# Patient Record
Sex: MALE | Race: BLACK OR AFRICAN AMERICAN | NOT HISPANIC OR LATINO | URBAN - METROPOLITAN AREA
[De-identification: names, ages, dates, MRNs, and addresses within clinical notes are randomized per-mention and may not be internally consistent; named-entity substitution may affect disease eponyms.]

---

## 2017-05-30 ENCOUNTER — INPATIENT (INPATIENT)
Facility: HOSPITAL | Age: 27
LOS: 6 days | Discharge: HOME | DRG: 885 | End: 2017-06-06
Attending: STUDENT IN AN ORGANIZED HEALTH CARE EDUCATION/TRAINING PROGRAM | Admitting: STUDENT IN AN ORGANIZED HEALTH CARE EDUCATION/TRAINING PROGRAM
Payer: COMMERCIAL

## 2017-05-30 VITALS
OXYGEN SATURATION: 100 % | TEMPERATURE: 98 F | WEIGHT: 223.11 LBS | DIASTOLIC BLOOD PRESSURE: 88 MMHG | SYSTOLIC BLOOD PRESSURE: 145 MMHG | RESPIRATION RATE: 17 BRPM | HEART RATE: 93 BPM

## 2017-05-30 DIAGNOSIS — F10.20 ALCOHOL DEPENDENCE, UNCOMPLICATED: ICD-10-CM

## 2017-05-30 DIAGNOSIS — F20.0 PARANOID SCHIZOPHRENIA: ICD-10-CM

## 2017-05-30 LAB
AMPHET UR-MCNC: NEGATIVE — SIGNIFICANT CHANGE UP
ANION GAP SERPL CALC-SCNC: 12 MMOL/L — SIGNIFICANT CHANGE UP (ref 5–17)
APAP SERPL-MCNC: <15 UG/ML — SIGNIFICANT CHANGE UP (ref 10–30)
APPEARANCE UR: CLEAR — SIGNIFICANT CHANGE UP
BARBITURATES UR SCN-MCNC: NEGATIVE — SIGNIFICANT CHANGE UP
BASOPHILS NFR BLD AUTO: 0.2 % — SIGNIFICANT CHANGE UP (ref 0–2)
BENZODIAZ UR-MCNC: NEGATIVE — SIGNIFICANT CHANGE UP
BILIRUB UR-MCNC: NEGATIVE — SIGNIFICANT CHANGE UP
BUN SERPL-MCNC: 10 MG/DL — SIGNIFICANT CHANGE UP (ref 7–23)
CALCIUM SERPL-MCNC: 10.2 MG/DL — SIGNIFICANT CHANGE UP (ref 8.4–10.5)
CHLORIDE SERPL-SCNC: 97 MMOL/L — SIGNIFICANT CHANGE UP (ref 96–108)
CO2 SERPL-SCNC: 28 MMOL/L — SIGNIFICANT CHANGE UP (ref 22–31)
COCAINE METAB.OTHER UR-MCNC: NEGATIVE — SIGNIFICANT CHANGE UP
COLOR SPEC: YELLOW — SIGNIFICANT CHANGE UP
CREAT SERPL-MCNC: 1.1 MG/DL — SIGNIFICANT CHANGE UP (ref 0.5–1.3)
DIFF PNL FLD: NEGATIVE — SIGNIFICANT CHANGE UP
EOSINOPHIL NFR BLD AUTO: 0.9 % — SIGNIFICANT CHANGE UP (ref 0–6)
GLUCOSE SERPL-MCNC: 79 MG/DL — SIGNIFICANT CHANGE UP (ref 70–99)
GLUCOSE UR QL: NEGATIVE — SIGNIFICANT CHANGE UP
HCT VFR BLD CALC: 45.5 % — SIGNIFICANT CHANGE UP (ref 39–50)
HGB BLD-MCNC: 16.2 G/DL — SIGNIFICANT CHANGE UP (ref 13–17)
KETONES UR-MCNC: NEGATIVE — SIGNIFICANT CHANGE UP
LEUKOCYTE ESTERASE UR-ACNC: NEGATIVE — SIGNIFICANT CHANGE UP
LYMPHOCYTES # BLD AUTO: 22.1 % — SIGNIFICANT CHANGE UP (ref 13–44)
MCHC RBC-ENTMCNC: 32.9 PG — SIGNIFICANT CHANGE UP (ref 27–34)
MCHC RBC-ENTMCNC: 35.6 G/DL — SIGNIFICANT CHANGE UP (ref 32–36)
MCV RBC AUTO: 92.3 FL — SIGNIFICANT CHANGE UP (ref 80–100)
METHADONE UR-MCNC: NEGATIVE — SIGNIFICANT CHANGE UP
MONOCYTES NFR BLD AUTO: 13.7 % — SIGNIFICANT CHANGE UP (ref 2–14)
NEUTROPHILS NFR BLD AUTO: 63.1 % — SIGNIFICANT CHANGE UP (ref 43–77)
NITRITE UR-MCNC: NEGATIVE — SIGNIFICANT CHANGE UP
OPIATES UR-MCNC: NEGATIVE — SIGNIFICANT CHANGE UP
PCP SPEC-MCNC: SIGNIFICANT CHANGE UP
PCP UR-MCNC: NEGATIVE — SIGNIFICANT CHANGE UP
PH UR: 6 — SIGNIFICANT CHANGE UP (ref 5–8)
PLATELET # BLD AUTO: 167 K/UL — SIGNIFICANT CHANGE UP (ref 150–400)
POTASSIUM SERPL-MCNC: 3.9 MMOL/L — SIGNIFICANT CHANGE UP (ref 3.5–5.3)
POTASSIUM SERPL-SCNC: 3.9 MMOL/L — SIGNIFICANT CHANGE UP (ref 3.5–5.3)
PROT UR-MCNC: NEGATIVE MG/DL — SIGNIFICANT CHANGE UP
RBC # BLD: 4.93 M/UL — SIGNIFICANT CHANGE UP (ref 4.2–5.8)
RBC # FLD: 12.4 % — SIGNIFICANT CHANGE UP (ref 10.3–16.9)
SALICYLATES SERPL-MCNC: <0.3 MG/DL — LOW (ref 2.8–20)
SODIUM SERPL-SCNC: 137 MMOL/L — SIGNIFICANT CHANGE UP (ref 135–145)
SP GR SPEC: <=1.005 — SIGNIFICANT CHANGE UP (ref 1–1.03)
THC UR QL: NEGATIVE — SIGNIFICANT CHANGE UP
UROBILINOGEN FLD QL: 1 E.U./DL — SIGNIFICANT CHANGE UP
WBC # BLD: 4.4 K/UL — SIGNIFICANT CHANGE UP (ref 3.8–10.5)
WBC # FLD AUTO: 4.4 K/UL — SIGNIFICANT CHANGE UP (ref 3.8–10.5)

## 2017-05-30 PROCEDURE — 93010 ELECTROCARDIOGRAM REPORT: CPT

## 2017-05-30 PROCEDURE — 99285 EMERGENCY DEPT VISIT HI MDM: CPT

## 2017-05-30 PROCEDURE — 99285 EMERGENCY DEPT VISIT HI MDM: CPT | Mod: 25

## 2017-05-30 RX ORDER — RISPERIDONE 4 MG/1
0.5 TABLET ORAL AT BEDTIME
Qty: 0 | Refills: 0 | Status: DISCONTINUED | OUTPATIENT
Start: 2017-05-30 | End: 2017-06-06

## 2017-05-30 RX ORDER — RISPERIDONE 4 MG/1
2 TABLET ORAL
Qty: 0 | Refills: 0 | Status: DISCONTINUED | OUTPATIENT
Start: 2017-05-30 | End: 2017-05-30

## 2017-05-30 RX ADMIN — Medication 1 MILLIGRAM(S): at 17:45

## 2017-05-30 RX ADMIN — Medication 1 MILLIGRAM(S): at 22:12

## 2017-05-30 RX ADMIN — RISPERIDONE 0.5 MILLIGRAM(S): 4 TABLET ORAL at 22:13

## 2017-05-30 NOTE — ED BEHAVIORAL HEALTH ASSESSMENT NOTE - DESCRIPTION
on 1:1 in hallway accompanied by mother.  In behavioral control. seasonal allergies; s/o knee infection per report Lives at home with his mom, her b.f., and his younger sister.  She works at same company of mom who is a  - for social support service of mental illness.  Pt is 4th of 6 children from his mom who has been  twice, first to his father  from him "years ago", and the other.  4 of his sibs have children and 3 are . on 1:1 in hallway accompanied by mother.  In behavioral control.  Referred to [facility] ETP program which was contacted this weekend, and had pt meet with coordinator, Belinda Adkins.  Initially accepted, became hesitant and reluctant, but ultimately opted to pursue psychiatric admission.

## 2017-05-30 NOTE — ED BEHAVIORAL HEALTH ASSESSMENT NOTE - RISK ASSESSMENT
pt is at acutely increased risk based on new-onset sxs causing significant distress and difficulty with management in the outpatient setting, but he is not suicidal or manifesting violent or aggressive behavior, though does become slightly agitated/ restless.

## 2017-05-30 NOTE — ED ADULT NURSE NOTE - NS ED NURSE LEVEL OF CONSCIOUSNESS ORIENTATION
Oriented - self; Oriented - place; Oriented - time Hallucination - audio/Oriented - self; Oriented - place; Oriented - time

## 2017-05-30 NOTE — ED BEHAVIORAL HEALTH ASSESSMENT NOTE - OTHER PAST PSYCHIATRIC HISTORY (INCLUDE DETAILS REGARDING ONSET, COURSE OF ILLNESS, INPATIENT/OUTPATIENT TREATMENT)
dx with severe speech and language deficits starting in [employer], as well as other cognitive impairment with possible short-term memory impairment per recall from mother since 3rd-5th grade, engaged in special education, only completed 10th grade, and failed to complete GED or 'pre-GED' courses.  Was in treatment with SW x 1-2 years, but then dx with possible 'mild' autistic d/o ("possible 'Asperger's' d/o, per mom), referred to psychiatrist without success due to logistical care coordination issue, it seems.  No other past psychotropic agents prescribed, no psychiatric hospitalizations.

## 2017-05-30 NOTE — ED ADULT NURSE NOTE - CHIEF COMPLAINT QUOTE
"I started hearing voices telling me curses that started Saturday."  Brought in by mother for paranoia, Hallucinations.  Also c/o chest pain, headache, abdominal pain.  Denies SI.

## 2017-05-30 NOTE — ED BEHAVIORAL HEALTH ASSESSMENT NOTE - DETAILS
father- depression; paternal uncle- possible SMPI; niece- possibly also on risperidone: all per mom. dad- crack/ cocaine use disorder, status unknown; maternal grandmother and aunt- both crack and alcohol use disorder in remission h/a presented to nursing mother present and accompanying pt

## 2017-05-30 NOTE — ED BEHAVIORAL HEALTH ASSESSMENT NOTE - DESCRIPTION (FIRST USE, LAST USE, QUANTITY, FREQUENCY, DURATION)
minimized, pt report of 1-2x weekly, liquor- mainly hoang; per mom- 3-4 day binges 2-4x weekly, with a day to a few interceding, if any, only since progression of sxs, over the last few mos.

## 2017-05-30 NOTE — ED BEHAVIORAL HEALTH ASSESSMENT NOTE - MEDICAL ISSUES AND PLAN (INCLUDE STANDING AND PRN MEDICATION)
Claritin prn, Tylenol prn Claritin prn, Tylenol prn; f/u TSH, lipids, metabolic parameters (ordered) on atypical antipsychotic; alcohol level and CMP recommended; no need for HCT which was conducted at OSH with result negative and available

## 2017-05-30 NOTE — ED PROVIDER NOTE - PHYSICAL EXAMINATION
CONSTITUTIONAL: Well-appearing; well-nourished; in no apparent distress.   HEAD: Normocephalic; atraumatic.   EYES: PERRL; EOM intact; conjunctiva and sclera clear  ENT: normal nose; no rhinorrhea; MMM  NECK: Supple; non-tender;   CARDIOVASCULAR: Normal S1, S2; no murmurs, rubs, or gallops. Regular rate and rhythm.   RESPIRATORY: Breathing easily; breath sounds clear and equal bilaterally; no wheezes, rhonchi, or rales.  GI: Soft; non-distended; non-tender;   EXT: No cyanosis or edema; N/V intact  SKIN: Normal for age and race; warm; dry; good turgor; no apparent lesions or rash.   NEURO: A & O x 3; face symmetric; grossly unremarkable.   PSYCHOLOGICAL: The patient’s mood and manner are appropriate. No SI/HI

## 2017-05-30 NOTE — ED BEHAVIORAL HEALTH ASSESSMENT NOTE - PSYCHIATRIC ISSUES AND PLAN (INCLUDE STANDING AND PRN MEDICATION)
c/w risperidone 2 mg BID; risperidone 0.5 mg q4h prn agitation, and at bedtime prn insomnia c/w risperidone 2 mg BID; risperidone 0.5 mg q4h prn agitation, and at bedtime prn insomnia; engage as possible as pt became somewhat reluctant to pursue admission; consider dispo to [facility] ETP on discharge

## 2017-05-30 NOTE — ED BEHAVIORAL HEALTH ASSESSMENT NOTE - SUMMARY
25yo SBM with h/o possible mild autistic d/o p/w progressive sxs of psychosis with auditory hallucinations over the last 6 mos, becoming distressing over the last several days for pt who presented and was released from OSH with substantial dose of risperidone just started since then, yesterday, but sxs have become difficult to manage in the outpatient setting.

## 2017-05-30 NOTE — ED BEHAVIORAL HEALTH ASSESSMENT NOTE - HPI (INCLUDE ILLNESS QUALITY, SEVERITY, DURATION, TIMING, CONTEXT, MODIFYING FACTORS, ASSOCIATED SIGNS AND SYMPTOMS)
Pt reports that he's been experiencing auditory hallucinations for the last few days of a few voices, recognized as including his brother-in-law and a woman, both confirmed to be people with whom he has troubled rapport, calling him names/ cursing/ taunting him.  He also c/o associated insomnia.  He does endorse 'distress' and "anger", denies depressed mood, suicidal ideation, homicidal ideation, self-harm or aggressive thoughts.    He denies any other/ attenuated such sxs.  However, his mom does report having persisted x 6 mos over which course he's been observed attending internal stimuli/ 'mumbling to himself', e.g. per present report noticed to be having conversations on the phone without anyone on the other end of the line, or recurrently arguing with others not present, confirmed increased irritability, and this described as not at all typical for the patient.    He also describes experiencing "pain", specifically h/a and mom also reports c/o stomach ache.  He contacted emergency services because he was 'concerned about his health', having evidently become increasingly agitated and pacing, without redirection or calming, and was apparently BIBA to [facility] ED where he was released with prescription for Risperdal 2 mg BID which he's taken since then, starting yesterday.  Since then, however, his sxs markedly progressed with his mom reporting that he began stating that "lasers are shooting" at him and he put blankets over the windows to block this, and has also manifest other generally bizarre behavior per report to ED physician.  [facility] was listed as a resource so pt and mom presented to ED here.  Pt is guarded about persecutory delusions, claiming that he just once saw a laser light and thought that a gun was being pointed at him, but his mom describes this happening recurrently based evidently on visual illusion from the TV screen or computer monitor about which she repeatedly redirected him, and that he also c/o mind/ body control. He does not indicate any clear stressors, and his mom corroborates that he's just seemed "stressed" and has not been sleeping but is not able to clarify any concrete changes or triggers, even though she does describe that he "doesn't like change" but only noting on grasping for recent life events that [place] the eldest 5 [place] ago and the youngest 1 year ago, both for work/ family.  She does not indicate any overt alarm regarding self-harm or violent behavior/ risk but describes that he's been persistently paranoid and disorganized/ bizarre in behavior, requiring support that is interfering with independent functioning even at home such that she has had to take off from work to respond to his distress.  He specifies feeling angry about his sxs, and seeks reassurance.    Typical daytime activities consist of playing video games, basketball, or going for walks.    He does endorse drinking alcohol regularly, specifically hoang, but minimizes frequency/ amount/ impairment, with mom reporting that he will drink on 3-4 day binges but seemingly with only a day or a few interceding, if any, as she notes this being 2-4x weekly, and consuming beyond a level of intoxication to becoming impaired in speech and motor functioning, only since progression of sxs in the past few mos.  It seems that he drinks partly for mitigation of sxs which diminish as a result. Denies any other recreational or illicit substance use.

## 2017-05-30 NOTE — ED PROVIDER NOTE - OBJECTIVE STATEMENT
27yo man with c/o hearing voices. Pt states the voices tell him all sorts of things, however do not tell him to hurt himself nor others. Pt states this has been going on for months. Mother indicates over the past 6 months they intermittently heard him talk to himself however over the past weekend things worsened and pt began consistently pacing, stated laser beams were being shot through the windows and that someone was taking over his body. Pt called the police on sat to be taken to the hospital and was seen at [place] where he had a HCT was seen in the psych ED and discharged with Risperidone which he has been taking. Mom indicates he seems to be getting worse and the medication is not working.

## 2017-05-30 NOTE — ED ADULT NURSE NOTE - CHPI ED SYMPTOMS NEG
no weight loss/no suicidal/no confusion/no change in level of consciousness/no weakness/no disorientation/no homicidal/no agitation

## 2017-05-30 NOTE — ED ADULT TRIAGE NOTE - CHIEF COMPLAINT QUOTE
"I started hearing voices telling me curses that started Saturday."  Brought in by mother for paranoia, HI.  Also c/o chest pain, headache, abdominal pain.  Denies SI. "I started hearing voices telling me curses that started Saturday."  Brought in by mother for paranoia, Hallucinations.  Also c/o chest pain, headache, abdominal pain.  Denies SI.

## 2017-05-31 PROCEDURE — 99223 1ST HOSP IP/OBS HIGH 75: CPT

## 2017-05-31 RX ORDER — DIPHENHYDRAMINE HCL 50 MG
50 CAPSULE ORAL ONCE
Qty: 0 | Refills: 0 | Status: COMPLETED | OUTPATIENT
Start: 2017-05-30 | End: 2017-05-31

## 2017-05-31 RX ORDER — RISPERIDONE 4 MG/1
1 TABLET ORAL
Qty: 0 | Refills: 0 | Status: DISCONTINUED | OUTPATIENT
Start: 2017-05-31 | End: 2017-06-01

## 2017-05-31 RX ORDER — HALOPERIDOL DECANOATE 100 MG/ML
5 INJECTION INTRAMUSCULAR ONCE
Qty: 0 | Refills: 0 | Status: COMPLETED | OUTPATIENT
Start: 2017-05-31 | End: 2017-05-31

## 2017-05-31 RX ORDER — HALOPERIDOL DECANOATE 100 MG/ML
5 INJECTION INTRAMUSCULAR ONCE
Qty: 0 | Refills: 0 | Status: COMPLETED | OUTPATIENT
Start: 2017-05-30 | End: 2017-05-31

## 2017-05-31 RX ORDER — DIPHENHYDRAMINE HCL 50 MG
50 CAPSULE ORAL ONCE
Qty: 0 | Refills: 0 | Status: COMPLETED | OUTPATIENT
Start: 2017-05-31 | End: 2017-05-31

## 2017-05-31 RX ADMIN — HALOPERIDOL DECANOATE 5 MILLIGRAM(S): 100 INJECTION INTRAMUSCULAR at 00:13

## 2017-05-31 RX ADMIN — Medication 1 MILLIGRAM(S): at 09:59

## 2017-05-31 RX ADMIN — HALOPERIDOL DECANOATE 5 MILLIGRAM(S): 100 INJECTION INTRAMUSCULAR at 13:33

## 2017-05-31 RX ADMIN — RISPERIDONE 1 MILLIGRAM(S): 4 TABLET ORAL at 21:42

## 2017-05-31 RX ADMIN — Medication 1 MILLIGRAM(S): at 21:42

## 2017-05-31 RX ADMIN — Medication 2 MILLIGRAM(S): at 00:13

## 2017-05-31 RX ADMIN — Medication 50 MILLIGRAM(S): at 13:33

## 2017-05-31 RX ADMIN — Medication 50 MILLIGRAM(S): at 00:13

## 2017-05-31 RX ADMIN — Medication 2 MILLIGRAM(S): at 13:33

## 2017-06-01 PROCEDURE — 99232 SBSQ HOSP IP/OBS MODERATE 35: CPT

## 2017-06-01 RX ORDER — SERTRALINE 25 MG/1
50 TABLET, FILM COATED ORAL ONCE
Qty: 0 | Refills: 0 | Status: COMPLETED | OUTPATIENT
Start: 2017-06-01 | End: 2017-06-01

## 2017-06-01 RX ORDER — SERTRALINE 25 MG/1
50 TABLET, FILM COATED ORAL DAILY
Qty: 0 | Refills: 0 | Status: DISCONTINUED | OUTPATIENT
Start: 2017-06-02 | End: 2017-06-06

## 2017-06-01 RX ORDER — RISPERIDONE 4 MG/1
2 TABLET ORAL
Qty: 0 | Refills: 0 | Status: DISCONTINUED | OUTPATIENT
Start: 2017-06-01 | End: 2017-06-02

## 2017-06-01 RX ADMIN — SERTRALINE 50 MILLIGRAM(S): 25 TABLET, FILM COATED ORAL at 15:46

## 2017-06-01 RX ADMIN — RISPERIDONE 2 MILLIGRAM(S): 4 TABLET ORAL at 22:03

## 2017-06-01 RX ADMIN — Medication 1 MILLIGRAM(S): at 17:32

## 2017-06-01 RX ADMIN — RISPERIDONE 1 MILLIGRAM(S): 4 TABLET ORAL at 10:21

## 2017-06-01 RX ADMIN — Medication 1 MILLIGRAM(S): at 10:21

## 2017-06-01 RX ADMIN — Medication 1 MILLIGRAM(S): at 22:03

## 2017-06-02 PROCEDURE — 99232 SBSQ HOSP IP/OBS MODERATE 35: CPT

## 2017-06-02 RX ORDER — RISPERIDONE 4 MG/1
2 TABLET ORAL DAILY
Qty: 0 | Refills: 0 | Status: DISCONTINUED | OUTPATIENT
Start: 2017-06-02 | End: 2017-06-06

## 2017-06-02 RX ORDER — RISPERIDONE 4 MG/1
3 TABLET ORAL AT BEDTIME
Qty: 0 | Refills: 0 | Status: DISCONTINUED | OUTPATIENT
Start: 2017-06-02 | End: 2017-06-06

## 2017-06-02 RX ORDER — DIPHENHYDRAMINE HCL 50 MG
50 CAPSULE ORAL EVERY 6 HOURS
Qty: 0 | Refills: 0 | Status: DISCONTINUED | OUTPATIENT
Start: 2017-06-02 | End: 2017-06-06

## 2017-06-02 RX ORDER — HALOPERIDOL DECANOATE 100 MG/ML
5 INJECTION INTRAMUSCULAR EVERY 6 HOURS
Qty: 0 | Refills: 0 | Status: DISCONTINUED | OUTPATIENT
Start: 2017-06-02 | End: 2017-06-06

## 2017-06-02 RX ADMIN — RISPERIDONE 0.5 MILLIGRAM(S): 4 TABLET ORAL at 13:53

## 2017-06-02 RX ADMIN — RISPERIDONE 3 MILLIGRAM(S): 4 TABLET ORAL at 20:52

## 2017-06-02 RX ADMIN — Medication 50 MILLIGRAM(S): at 21:14

## 2017-06-02 RX ADMIN — RISPERIDONE 2 MILLIGRAM(S): 4 TABLET ORAL at 10:25

## 2017-06-02 RX ADMIN — SERTRALINE 50 MILLIGRAM(S): 25 TABLET, FILM COATED ORAL at 10:25

## 2017-06-02 RX ADMIN — Medication 1 MILLIGRAM(S): at 10:25

## 2017-06-02 RX ADMIN — HALOPERIDOL DECANOATE 5 MILLIGRAM(S): 100 INJECTION INTRAMUSCULAR at 20:54

## 2017-06-02 RX ADMIN — Medication 1 MILLIGRAM(S): at 20:52

## 2017-06-03 RX ADMIN — Medication 50 MILLIGRAM(S): at 15:59

## 2017-06-03 RX ADMIN — RISPERIDONE 2 MILLIGRAM(S): 4 TABLET ORAL at 10:06

## 2017-06-03 RX ADMIN — Medication 1 MILLIGRAM(S): at 10:06

## 2017-06-03 RX ADMIN — RISPERIDONE 3 MILLIGRAM(S): 4 TABLET ORAL at 21:27

## 2017-06-03 RX ADMIN — HALOPERIDOL DECANOATE 5 MILLIGRAM(S): 100 INJECTION INTRAMUSCULAR at 15:59

## 2017-06-03 RX ADMIN — Medication 50 MILLIGRAM(S): at 21:27

## 2017-06-03 RX ADMIN — Medication 1 MILLIGRAM(S): at 21:26

## 2017-06-03 RX ADMIN — SERTRALINE 50 MILLIGRAM(S): 25 TABLET, FILM COATED ORAL at 10:06

## 2017-06-04 RX ADMIN — SERTRALINE 50 MILLIGRAM(S): 25 TABLET, FILM COATED ORAL at 09:48

## 2017-06-04 RX ADMIN — RISPERIDONE 2 MILLIGRAM(S): 4 TABLET ORAL at 09:48

## 2017-06-04 RX ADMIN — Medication 50 MILLIGRAM(S): at 22:54

## 2017-06-04 RX ADMIN — RISPERIDONE 3 MILLIGRAM(S): 4 TABLET ORAL at 21:36

## 2017-06-04 RX ADMIN — Medication 1 MILLIGRAM(S): at 09:48

## 2017-06-04 RX ADMIN — Medication 1 MILLIGRAM(S): at 21:36

## 2017-06-05 PROCEDURE — 99232 SBSQ HOSP IP/OBS MODERATE 35: CPT

## 2017-06-05 RX ADMIN — Medication 1 MILLIGRAM(S): at 21:50

## 2017-06-05 RX ADMIN — Medication 1 MILLIGRAM(S): at 10:42

## 2017-06-05 RX ADMIN — SERTRALINE 50 MILLIGRAM(S): 25 TABLET, FILM COATED ORAL at 10:42

## 2017-06-05 RX ADMIN — RISPERIDONE 2 MILLIGRAM(S): 4 TABLET ORAL at 10:41

## 2017-06-05 RX ADMIN — RISPERIDONE 3 MILLIGRAM(S): 4 TABLET ORAL at 21:49

## 2017-06-06 VITALS
WEIGHT: 192.02 LBS | SYSTOLIC BLOOD PRESSURE: 129 MMHG | HEART RATE: 90 BPM | DIASTOLIC BLOOD PRESSURE: 81 MMHG | RESPIRATION RATE: 20 BRPM | TEMPERATURE: 99 F

## 2017-06-06 PROCEDURE — 99238 HOSP IP/OBS DSCHRG MGMT 30/<: CPT

## 2017-06-06 RX ORDER — DIPHENHYDRAMINE HCL 50 MG
1 CAPSULE ORAL
Qty: 14 | Refills: 0 | OUTPATIENT
Start: 2017-06-06 | End: 2017-06-20

## 2017-06-06 RX ORDER — SERTRALINE 25 MG/1
1 TABLET, FILM COATED ORAL
Qty: 0 | Refills: 0 | COMMUNITY
Start: 2017-06-06

## 2017-06-06 RX ORDER — RISPERIDONE 4 MG/1
1 TABLET ORAL
Qty: 0 | Refills: 0 | COMMUNITY

## 2017-06-06 RX ORDER — SERTRALINE 25 MG/1
1 TABLET, FILM COATED ORAL
Qty: 14 | Refills: 0 | OUTPATIENT
Start: 2017-06-06 | End: 2017-06-20

## 2017-06-06 RX ORDER — RISPERIDONE 4 MG/1
1 TABLET ORAL
Qty: 14 | Refills: 0 | OUTPATIENT
Start: 2017-06-06 | End: 2017-06-20

## 2017-06-06 RX ORDER — RISPERIDONE 4 MG/1
1 TABLET ORAL
Qty: 0 | Refills: 0 | COMMUNITY
Start: 2017-06-06

## 2017-06-06 RX ADMIN — RISPERIDONE 2 MILLIGRAM(S): 4 TABLET ORAL at 09:58

## 2017-06-06 RX ADMIN — Medication 1 MILLIGRAM(S): at 09:57

## 2017-06-06 RX ADMIN — SERTRALINE 50 MILLIGRAM(S): 25 TABLET, FILM COATED ORAL at 09:57

## 2017-06-11 DIAGNOSIS — Z72.89 OTHER PROBLEMS RELATED TO LIFESTYLE: ICD-10-CM

## 2017-06-11 DIAGNOSIS — F84.0 AUTISTIC DISORDER: ICD-10-CM

## 2017-06-11 DIAGNOSIS — F25.1 SCHIZOAFFECTIVE DISORDER, DEPRESSIVE TYPE: ICD-10-CM

## 2017-06-11 DIAGNOSIS — R44.2 OTHER HALLUCINATIONS: ICD-10-CM

## 2017-07-23 PROCEDURE — 99285 EMERGENCY DEPT VISIT HI MDM: CPT | Mod: 25

## 2017-07-23 PROCEDURE — 93005 ELECTROCARDIOGRAM TRACING: CPT

## 2017-07-23 PROCEDURE — 80307 DRUG TEST PRSMV CHEM ANLYZR: CPT

## 2017-07-23 PROCEDURE — 80076 HEPATIC FUNCTION PANEL: CPT

## 2017-07-23 PROCEDURE — 85025 COMPLETE CBC W/AUTO DIFF WBC: CPT

## 2017-07-23 PROCEDURE — 80048 BASIC METABOLIC PNL TOTAL CA: CPT

## 2017-07-23 PROCEDURE — 81003 URINALYSIS AUTO W/O SCOPE: CPT

## 2023-12-05 NOTE — ED ADULT TRIAGE NOTE - NS ED TRIAGE CLINICAL UPGRADE
1 Deteriorating patient status - Patient was clinically upgraded due to deteriorating patient status.

## 2025-04-22 NOTE — ED BEHAVIORAL HEALTH ASSESSMENT NOTE - DEPENDENTS
Detail Level: Simple
Information: This plan will allow you to select a body location and will not render the procedure on the note output. It will note the location you select in the morphology.
None known